# Patient Record
Sex: MALE | Race: WHITE | NOT HISPANIC OR LATINO | Employment: OTHER | URBAN - METROPOLITAN AREA
[De-identification: names, ages, dates, MRNs, and addresses within clinical notes are randomized per-mention and may not be internally consistent; named-entity substitution may affect disease eponyms.]

---

## 2018-11-13 ENCOUNTER — HOSPITAL ENCOUNTER (OUTPATIENT)
Facility: HOSPITAL | Age: 83
Discharge: HOME | End: 2018-11-15
Attending: EMERGENCY MEDICINE | Admitting: INTERNAL MEDICINE
Payer: MEDICARE

## 2018-11-13 DIAGNOSIS — I67.4 HYPERTENSIVE ENCEPHALOPATHY: Primary | ICD-10-CM

## 2018-11-13 DIAGNOSIS — G93.40 ENCEPHALOPATHY ACUTE: ICD-10-CM

## 2018-11-13 LAB
ALBUMIN SERPL BCP-MCNC: 3.4 G/DL
ALP SERPL-CCNC: 90 U/L
ALT SERPL W/O P-5'-P-CCNC: 23 U/L
AMMONIA PLAS-SCNC: 38 UMOL/L
ANION GAP SERPL CALC-SCNC: 7 MMOL/L
AST SERPL-CCNC: 39 U/L
BASOPHILS # BLD AUTO: 0.04 K/UL
BASOPHILS NFR BLD: 0.7 %
BILIRUB SERPL-MCNC: 0.5 MG/DL
BUN SERPL-MCNC: 18 MG/DL
CALCIUM SERPL-MCNC: 9 MG/DL
CHLORIDE SERPL-SCNC: 106 MMOL/L
CHOLEST SERPL-MCNC: 163 MG/DL
CHOLEST/HDLC SERPL: 3.8 {RATIO}
CO2 SERPL-SCNC: 25 MMOL/L
CREAT SERPL-MCNC: 1.1 MG/DL
DIFFERENTIAL METHOD: ABNORMAL
EOSINOPHIL # BLD AUTO: 0.2 K/UL
EOSINOPHIL NFR BLD: 4.1 %
ERYTHROCYTE [DISTWIDTH] IN BLOOD BY AUTOMATED COUNT: 13.6 %
EST. GFR  (AFRICAN AMERICAN): >60 ML/MIN/1.73 M^2
EST. GFR  (NON AFRICAN AMERICAN): >60 ML/MIN/1.73 M^2
GLUCOSE SERPL-MCNC: 110 MG/DL
HCT VFR BLD AUTO: 34.5 %
HDLC SERPL-MCNC: 43 MG/DL
HDLC SERPL: 26.4 %
HGB BLD-MCNC: 11.6 G/DL
IMM GRANULOCYTES # BLD AUTO: 0.03 K/UL
IMM GRANULOCYTES NFR BLD AUTO: 0.5 %
INR PPP: 1
LDLC SERPL CALC-MCNC: 106.2 MG/DL
LYMPHOCYTES # BLD AUTO: 0.7 K/UL
LYMPHOCYTES NFR BLD: 11.8 %
MAGNESIUM SERPL-MCNC: 1.8 MG/DL
MCH RBC QN AUTO: 31.6 PG
MCHC RBC AUTO-ENTMCNC: 33.6 G/DL
MCV RBC AUTO: 94 FL
MONOCYTES # BLD AUTO: 0.6 K/UL
MONOCYTES NFR BLD: 10.3 %
NEUTROPHILS # BLD AUTO: 4.3 K/UL
NEUTROPHILS NFR BLD: 72.6 %
NONHDLC SERPL-MCNC: 120 MG/DL
NRBC BLD-RTO: 0 /100 WBC
PLATELET # BLD AUTO: 185 K/UL
PMV BLD AUTO: 9.4 FL
POTASSIUM SERPL-SCNC: 3.8 MMOL/L
PROT SERPL-MCNC: 7.6 G/DL
PROTHROMBIN TIME: 10.5 SEC
RBC # BLD AUTO: 3.67 M/UL
SODIUM SERPL-SCNC: 138 MMOL/L
TRIGL SERPL-MCNC: 69 MG/DL
WBC # BLD AUTO: 5.91 K/UL

## 2018-11-13 PROCEDURE — 83735 ASSAY OF MAGNESIUM: CPT

## 2018-11-13 PROCEDURE — 82140 ASSAY OF AMMONIA: CPT

## 2018-11-13 PROCEDURE — 85610 PROTHROMBIN TIME: CPT

## 2018-11-13 PROCEDURE — 84443 ASSAY THYROID STIM HORMONE: CPT

## 2018-11-13 PROCEDURE — 99285 EMERGENCY DEPT VISIT HI MDM: CPT

## 2018-11-13 PROCEDURE — 99291 CRITICAL CARE FIRST HOUR: CPT | Mod: GC,,, | Performed by: EMERGENCY MEDICINE

## 2018-11-13 PROCEDURE — 80053 COMPREHEN METABOLIC PANEL: CPT

## 2018-11-13 PROCEDURE — 80061 LIPID PANEL: CPT

## 2018-11-13 PROCEDURE — 85025 COMPLETE CBC W/AUTO DIFF WBC: CPT

## 2018-11-14 PROBLEM — G93.40 ENCEPHALOPATHY ACUTE: Status: ACTIVE | Noted: 2018-11-14

## 2018-11-14 PROBLEM — I50.9 CHF (CONGESTIVE HEART FAILURE): Status: ACTIVE | Noted: 2018-11-14

## 2018-11-14 PROBLEM — I25.10 CORONARY ARTERY DISEASE INVOLVING NATIVE CORONARY ARTERY OF NATIVE HEART: Status: ACTIVE | Noted: 2018-11-14

## 2018-11-14 PROBLEM — I10 HYPERTENSION: Status: ACTIVE | Noted: 2018-11-14

## 2018-11-14 LAB
AMPHET+METHAMPHET UR QL: NEGATIVE
ANION GAP SERPL CALC-SCNC: 7 MMOL/L
ASCENDING AORTA: 3.62 CM
AV MEAN GRADIENT: 18 MMHG
AV PEAK GRADIENT: 30.69 MMHG
AV VALVE AREA: 1.13 CM2
BARBITURATES UR QL SCN>200 NG/ML: NEGATIVE
BASOPHILS # BLD AUTO: 0.03 K/UL
BASOPHILS NFR BLD: 0.7 %
BENZODIAZ UR QL SCN>200 NG/ML: NEGATIVE
BILIRUB UR QL STRIP: NEGATIVE
BSA FOR ECHO PROCEDURE: 1.89 M2
BUN SERPL-MCNC: 19 MG/DL
BZE UR QL SCN: NEGATIVE
CALCIUM SERPL-MCNC: 8.4 MG/DL
CANNABINOIDS UR QL SCN: NEGATIVE
CHLORIDE SERPL-SCNC: 109 MMOL/L
CLARITY UR REFRACT.AUTO: CLEAR
CO2 SERPL-SCNC: 23 MMOL/L
COLOR UR AUTO: YELLOW
CREAT SERPL-MCNC: 1.1 MG/DL
CREAT UR-MCNC: 24 MG/DL
CV ECHO LV RWT: 0.37 CM
DIFFERENTIAL METHOD: ABNORMAL
DOP CALC AO PEAK VEL: 2.77 M/S
DOP CALC AO VTI: 56.23 CM
DOP CALC LVOT AREA: 3.2 CM2
DOP CALC LVOT DIAMETER: 2.02 CM
DOP CALC LVOT STROKE VOLUME: 63.71 CM3
DOP CALCLVOT PEAK VEL VTI: 19.89 CM
E WAVE DECELERATION TIME: 281.96 MSEC
E/A RATIO: 0.85
E/E' RATIO: 20.27
ECHO LV POSTERIOR WALL: 0.91 CM (ref 0.6–1.1)
EOSINOPHIL # BLD AUTO: 0.3 K/UL
EOSINOPHIL NFR BLD: 6.1 %
ERYTHROCYTE [DISTWIDTH] IN BLOOD BY AUTOMATED COUNT: 13.7 %
EST. GFR  (AFRICAN AMERICAN): >60 ML/MIN/1.73 M^2
EST. GFR  (NON AFRICAN AMERICAN): >60 ML/MIN/1.73 M^2
ESTIMATED AVG GLUCOSE: 103 MG/DL
FRACTIONAL SHORTENING: 28 % (ref 28–44)
GLUCOSE SERPL-MCNC: 109 MG/DL
GLUCOSE UR QL STRIP: NEGATIVE
HBA1C MFR BLD HPLC: 5.2 %
HCT VFR BLD AUTO: 31.5 %
HGB BLD-MCNC: 10.5 G/DL
HGB UR QL STRIP: NEGATIVE
IMM GRANULOCYTES # BLD AUTO: 0.02 K/UL
IMM GRANULOCYTES NFR BLD AUTO: 0.4 %
INTERVENTRICULAR SEPTUM: 0.89 CM (ref 0.6–1.1)
KETONES UR QL STRIP: NEGATIVE
LA MAJOR: 4.86 CM
LA MINOR: 5.55 CM
LA WIDTH: 4.29 CM
LEFT ATRIUM SIZE: 3.91 CM
LEFT ATRIUM VOLUME INDEX: 39.1 ML/M2
LEFT ATRIUM VOLUME: 73.89 CM3
LEFT INTERNAL DIMENSION IN SYSTOLE: 3.5 CM (ref 2.1–4)
LEFT VENTRICLE DIASTOLIC VOLUME INDEX: 58.77 ML/M2
LEFT VENTRICLE DIASTOLIC VOLUME: 111.08 ML
LEFT VENTRICLE MASS INDEX: 80.1 G/M2
LEFT VENTRICLE SYSTOLIC VOLUME INDEX: 26.9 ML/M2
LEFT VENTRICLE SYSTOLIC VOLUME: 50.82 ML
LEFT VENTRICULAR INTERNAL DIMENSION IN DIASTOLE: 4.87 CM (ref 3.5–6)
LEFT VENTRICULAR MASS: 151.39 G
LEUKOCYTE ESTERASE UR QL STRIP: NEGATIVE
LV LATERAL E/E' RATIO: 25.33
LV SEPTAL E/E' RATIO: 16.89
LYMPHOCYTES # BLD AUTO: 0.7 K/UL
LYMPHOCYTES NFR BLD: 15.3 %
MAGNESIUM SERPL-MCNC: 1.9 MG/DL
MCH RBC QN AUTO: 31.5 PG
MCHC RBC AUTO-ENTMCNC: 33.3 G/DL
MCV RBC AUTO: 95 FL
METHADONE UR QL SCN>300 NG/ML: NEGATIVE
MONOCYTES # BLD AUTO: 0.6 K/UL
MONOCYTES NFR BLD: 13.7 %
MV MEAN GRADIENT: 6 MMHG
MV PEAK A VEL: 1.79 M/S
MV PEAK E VEL: 1.52 M/S
MV STENOSIS PRESSURE HALF TIME: 136 MS
MV VALVE AREA P 1/2 METHOD: 1.62 CM2
NEUTROPHILS # BLD AUTO: 2.8 K/UL
NEUTROPHILS NFR BLD: 63.8 %
NITRITE UR QL STRIP: NEGATIVE
NRBC BLD-RTO: 0 /100 WBC
OPIATES UR QL SCN: NEGATIVE
PCP UR QL SCN>25 NG/ML: NEGATIVE
PH UR STRIP: 8 [PH] (ref 5–8)
PHOSPHATE SERPL-MCNC: 3.2 MG/DL
PISA TR MAX VEL: 2.57 M/S
PLATELET # BLD AUTO: 176 K/UL
PMV BLD AUTO: 9.9 FL
POTASSIUM SERPL-SCNC: 3.5 MMOL/L
PROT UR QL STRIP: NEGATIVE
PULM VEIN S/D RATIO: 0.63
PV PEAK D VEL: 0.64 M/S
PV PEAK S VEL: 0.4 M/S
RA MAJOR: 4.71 CM
RA PRESSURE: 3 MMHG
RA WIDTH: 2.51 CM
RBC # BLD AUTO: 3.33 M/UL
RIGHT VENTRICULAR END-DIASTOLIC DIMENSION: 3.62 CM
RV TISSUE DOPPLER FREE WALL SYSTOLIC VELOCITY 1 (APICAL 4 CHAMBER VIEW): 9.08 M/S
SINUS: 3.62 CM
SODIUM SERPL-SCNC: 139 MMOL/L
SP GR UR STRIP: 1.01 (ref 1–1.03)
STJ: 2.73 CM
TDI LATERAL: 0.06
TDI SEPTAL: 0.09
TDI: 0.08
TOXICOLOGY INFORMATION: NORMAL
TR MAX PG: 26.42 MMHG
TRICUSPID ANNULAR PLANE SYSTOLIC EXCURSION: 1.26 CM
TSH SERPL DL<=0.005 MIU/L-ACNC: 2.38 UIU/ML
TV REST PULMONARY ARTERY PRESSURE: 29.42 MMHG
URN SPEC COLLECT METH UR: NORMAL
WBC # BLD AUTO: 4.45 K/UL

## 2018-11-14 PROCEDURE — 94660 CPAP INITIATION&MGMT: CPT

## 2018-11-14 PROCEDURE — 84100 ASSAY OF PHOSPHORUS: CPT

## 2018-11-14 PROCEDURE — 80307 DRUG TEST PRSMV CHEM ANLYZR: CPT

## 2018-11-14 PROCEDURE — 27000190 HC CPAP FULL FACE MASK W/VALVE

## 2018-11-14 PROCEDURE — 99220 PR INITIAL OBSERVATION CARE,LEVL III: CPT | Mod: ,,, | Performed by: PHYSICIAN ASSISTANT

## 2018-11-14 PROCEDURE — 81003 URINALYSIS AUTO W/O SCOPE: CPT | Mod: 59

## 2018-11-14 PROCEDURE — 99900035 HC TECH TIME PER 15 MIN (STAT)

## 2018-11-14 PROCEDURE — 83036 HEMOGLOBIN GLYCOSYLATED A1C: CPT

## 2018-11-14 PROCEDURE — G0378 HOSPITAL OBSERVATION PER HR: HCPCS

## 2018-11-14 PROCEDURE — 80048 BASIC METABOLIC PNL TOTAL CA: CPT

## 2018-11-14 PROCEDURE — 83735 ASSAY OF MAGNESIUM: CPT

## 2018-11-14 PROCEDURE — 85025 COMPLETE CBC W/AUTO DIFF WBC: CPT

## 2018-11-14 PROCEDURE — 36415 COLL VENOUS BLD VENIPUNCTURE: CPT

## 2018-11-14 PROCEDURE — 25000003 PHARM REV CODE 250: Performed by: PHYSICIAN ASSISTANT

## 2018-11-14 RX ORDER — CLOPIDOGREL BISULFATE 75 MG/1
75 TABLET ORAL DAILY
COMMUNITY

## 2018-11-14 RX ORDER — CLOPIDOGREL BISULFATE 75 MG/1
75 TABLET ORAL DAILY
Status: DISCONTINUED | OUTPATIENT
Start: 2018-11-14 | End: 2018-11-15 | Stop reason: HOSPADM

## 2018-11-14 RX ORDER — AMLODIPINE BESYLATE 5 MG/1
5 TABLET ORAL DAILY
Status: DISCONTINUED | OUTPATIENT
Start: 2018-11-14 | End: 2018-11-14

## 2018-11-14 RX ORDER — ONDANSETRON 8 MG/1
8 TABLET, ORALLY DISINTEGRATING ORAL EVERY 8 HOURS PRN
Status: DISCONTINUED | OUTPATIENT
Start: 2018-11-14 | End: 2018-11-15 | Stop reason: HOSPADM

## 2018-11-14 RX ORDER — HYDRALAZINE HYDROCHLORIDE 10 MG/1
10 TABLET, FILM COATED ORAL EVERY 8 HOURS PRN
Status: DISCONTINUED | OUTPATIENT
Start: 2018-11-14 | End: 2018-11-15 | Stop reason: HOSPADM

## 2018-11-14 RX ORDER — ACETAMINOPHEN 500 MG
1000 TABLET ORAL EVERY 8 HOURS PRN
Status: DISCONTINUED | OUTPATIENT
Start: 2018-11-14 | End: 2018-11-15 | Stop reason: HOSPADM

## 2018-11-14 RX ORDER — IPRATROPIUM BROMIDE AND ALBUTEROL SULFATE 2.5; .5 MG/3ML; MG/3ML
3 SOLUTION RESPIRATORY (INHALATION) EVERY 4 HOURS PRN
Status: DISCONTINUED | OUTPATIENT
Start: 2018-11-14 | End: 2018-11-15 | Stop reason: HOSPADM

## 2018-11-14 RX ORDER — NAPROXEN SODIUM 220 MG/1
81 TABLET, FILM COATED ORAL DAILY
COMMUNITY

## 2018-11-14 RX ORDER — IBUPROFEN 200 MG
24 TABLET ORAL
Status: DISCONTINUED | OUTPATIENT
Start: 2018-11-14 | End: 2018-11-15 | Stop reason: HOSPADM

## 2018-11-14 RX ORDER — LOSARTAN POTASSIUM 50 MG/1
100 TABLET ORAL DAILY
Status: DISCONTINUED | OUTPATIENT
Start: 2018-11-14 | End: 2018-11-14

## 2018-11-14 RX ORDER — SODIUM CHLORIDE 0.9 % (FLUSH) 0.9 %
5 SYRINGE (ML) INJECTION
Status: DISCONTINUED | OUTPATIENT
Start: 2018-11-14 | End: 2018-11-15 | Stop reason: HOSPADM

## 2018-11-14 RX ORDER — NAPROXEN SODIUM 220 MG/1
81 TABLET, FILM COATED ORAL DAILY
Status: DISCONTINUED | OUTPATIENT
Start: 2018-11-14 | End: 2018-11-15 | Stop reason: HOSPADM

## 2018-11-14 RX ORDER — ACETAMINOPHEN 325 MG/1
650 TABLET ORAL EVERY 8 HOURS PRN
Status: DISCONTINUED | OUTPATIENT
Start: 2018-11-14 | End: 2018-11-15 | Stop reason: HOSPADM

## 2018-11-14 RX ORDER — BISACODYL 10 MG
10 SUPPOSITORY, RECTAL RECTAL DAILY PRN
Status: DISCONTINUED | OUTPATIENT
Start: 2018-11-14 | End: 2018-11-15 | Stop reason: HOSPADM

## 2018-11-14 RX ORDER — IBUPROFEN 200 MG
16 TABLET ORAL
Status: DISCONTINUED | OUTPATIENT
Start: 2018-11-14 | End: 2018-11-15 | Stop reason: HOSPADM

## 2018-11-14 RX ORDER — LOSARTAN POTASSIUM 100 MG/1
100 TABLET ORAL DAILY
Status: ON HOLD | COMMUNITY
End: 2018-11-15 | Stop reason: HOSPADM

## 2018-11-14 RX ORDER — CARVEDILOL 3.12 MG/1
3.12 TABLET ORAL 2 TIMES DAILY
Status: DISCONTINUED | OUTPATIENT
Start: 2018-11-14 | End: 2018-11-14

## 2018-11-14 RX ORDER — AMLODIPINE BESYLATE 10 MG/1
10 TABLET ORAL DAILY
Status: DISCONTINUED | OUTPATIENT
Start: 2018-11-15 | End: 2018-11-15 | Stop reason: HOSPADM

## 2018-11-14 RX ORDER — ASPIRIN 325 MG
325 TABLET, DELAYED RELEASE (ENTERIC COATED) ORAL WEEKLY
COMMUNITY

## 2018-11-14 RX ORDER — POLYETHYLENE GLYCOL 3350 17 G/17G
17 POWDER, FOR SOLUTION ORAL DAILY
Status: DISCONTINUED | OUTPATIENT
Start: 2018-11-14 | End: 2018-11-15 | Stop reason: HOSPADM

## 2018-11-14 RX ORDER — ACETAMINOPHEN 325 MG/1
650 TABLET ORAL EVERY 4 HOURS PRN
Status: DISCONTINUED | OUTPATIENT
Start: 2018-11-14 | End: 2018-11-15 | Stop reason: HOSPADM

## 2018-11-14 RX ORDER — HYDROCHLOROTHIAZIDE 12.5 MG/1
12.5 TABLET ORAL DAILY
Status: DISCONTINUED | OUTPATIENT
Start: 2018-11-14 | End: 2018-11-15 | Stop reason: HOSPADM

## 2018-11-14 RX ORDER — GLUCAGON 1 MG
1 KIT INJECTION
Status: DISCONTINUED | OUTPATIENT
Start: 2018-11-14 | End: 2018-11-15 | Stop reason: HOSPADM

## 2018-11-14 RX ORDER — CARVEDILOL 3.12 MG/1
3.12 TABLET ORAL DAILY
Status: DISCONTINUED | OUTPATIENT
Start: 2018-11-14 | End: 2018-11-15

## 2018-11-14 RX ADMIN — POLYETHYLENE GLYCOL 3350 17 G: 17 POWDER, FOR SOLUTION ORAL at 08:11

## 2018-11-14 RX ADMIN — CLOPIDOGREL 75 MG: 75 TABLET, FILM COATED ORAL at 08:11

## 2018-11-14 RX ADMIN — CARVEDILOL 3.12 MG: 3.12 TABLET, FILM COATED ORAL at 04:11

## 2018-11-14 RX ADMIN — ASPIRIN 81 MG CHEWABLE TABLET 81 MG: 81 TABLET CHEWABLE at 08:11

## 2018-11-14 RX ADMIN — HYDROCHLOROTHIAZIDE 12.5 MG: 12.5 TABLET ORAL at 01:11

## 2018-11-14 RX ADMIN — AMLODIPINE BESYLATE 5 MG: 5 TABLET ORAL at 08:11

## 2018-11-14 NOTE — ASSESSMENT & PLAN NOTE
- stop losartan 2/2 reported hyperkalemia at home  - start norvasc 5 mg, hydralazine 10 mg PRN   - suspect patient was previously on hydralazine 50 mg TID w/o checking BP prior to give causing him to feel weak and was subsequently stopped  - avoid over correction of BP  - symptoms resolving with improvement in BP  - suspected SAH on CTH at OSH not confirmd with CTA, NCC consult appreciated  - neuro checks  - echo in AM

## 2018-11-14 NOTE — PLAN OF CARE
11/14/18 1245   Discharge Assessment   Assessment Type Discharge Planning Assessment   Confirmed/corrected address and phone number on facesheet? Yes   Assessment information obtained from? Patient;Caregiver  (spouse, Elizabeth Prescott ([phone]), & adult daughter, Annette Wong ([phone]))   Expected Length of Stay (days) 2   Communicated expected length of stay with patient/caregiver yes   Prior to hospitilization cognitive status: Alert/Oriented   Prior to hospitalization functional status: Independent   Current cognitive status: Alert/Oriented   Current Functional Status: Independent   Lives With spouse  (Elizabeth Prescott)   Able to Return to Prior Arrangements yes   Is patient able to care for self after discharge? Yes   Patient's perception of discharge disposition home or selfcare   Readmission Within The Last 30 Days no previous admission in last 30 days   Patient currently being followed by outpatient case management? No   Patient currently receives any other outside agency services? No   Equipment Currently Used at Home CPAP;other (see comments)  (BP machine)   Do you have any problems affording any of your prescribed medications? No   Is the patient taking medications as prescribed? yes   Does the patient have transportation home? Yes   Transportation Available family or friend will provide   Does the patient receive services at the [facility]? No   Discharge Plan A Home with family   Discharge Plan B Home Health   Patient/Family In Agreement With Plan yes     Dx: hypertensive encephalopathy  Pharm: [facility]  Hosp f/u appt: Dr. Pelon Gama (PCP) on 11/27/18 at 0915 & Dr. Greg Angulo (cards) on 12/4/18 at 0826

## 2018-11-14 NOTE — HOSPITAL COURSE
85M admitted to OBS for BP control in setting of HTN emergency SBP >200. Transferred from [facility] for concern for SAH on CTH. Neuro Critical Care at [facility] reviewed CTH and CTA, no compelling evidence of SAH. No aneurysm or other malformation on CTA. Likely hypertensive encephalopathy, which is began resolving since admission. Home Losartan 100mg discontinued as patient's family reports it caused him to be hyperkalemic. K 3.5>4.5 during stay. Started Norvasc 10mg, Coreg 6.25mg and HCTZ 12.5mg BP improving, 145/72 on discharge. TTE shows indeterminate diastolic dysfunction with pEF, severe mitral valve stenosis. Patient alert and oriented and back at baseline per family, stable for discharge with [facility] nurse to check BP Mon 11/19 as well as check BMP for K+ levels. PCP f/u 11/27 and Cardiology f/u 12/4.

## 2018-11-14 NOTE — ASSESSMENT & PLAN NOTE
Essential Hypertension  - stop losartan 2/2 reported hyperkalemia at home  - start norvasc 5 mg, hydralazine 10 mg PRN   - suspect patient was previously on hydralazine 50 mg TID w/o checking BP prior to give causing him to feel weak and was subsequently stopped  - avoid over correction of BP  - symptoms resolving with improvement in BP  - suspected SAH on CTH at OSH not confirmd with CTA, NCC consult appreciated  - neuro checks  - echo in AM  11/14: Renal US negative for renal artery stenosis  - increased Norvasc to 10mg; started Coreg 3.125mg and HCTZ 12.5mg daily  BP labile throughout day  -Echo shows indeterminate diastolic dysfunction with pEF, severe mitral valve stenosis.

## 2018-11-14 NOTE — H&P
[facility] Hospital Medicine  History & Physical    Patient Name: Rafael Prescott  MRN: [id number]  Admission Date: 11/13/2018  Attending Physician: ROBIN Koch MD   Primary Care Provider: [facility] Medicine Team: [facility] HOSP MED  Chente Fuentes PA-C     Patient information was obtained from patient, spouse/SO, relative(s), past medical records and ER records.     Subjective:     Principal Problem:Hypertensive encephalopathy    Chief Complaint:   Chief Complaint   [facility]   [facility]     Pt [facility] for neuro services for SAH and encephalopathy. Today at 1 pm, pt began with weakness and expressive aphasia. He was hypertensive at >200 SBP upon arrival to [facility]; cardene was started. Denies falls at home. Pt is currently taking plavix and asa at home. He is A,A,O x 2; disoriented to time.          HPI: Rafael Prescott is an 85 y.o. male with HTN, HLD, CAD s/p CABG x 4 on DAPT who presents as a transfer from [facility] for confusion and weakness. Family reports onset of symptoms at 1 pm when he acutely developed confusion, shuffling gait, slumping to the right, and expressive aphasia. He presented to [facility] around 230 and his SBP >200 and started on cardene drip, CTH showed questionable R SAH. CTA negative for aneurysm. Patient was transferred here for evaluation of possible SAH. On arrival to [facility] ED, BP was better controlled cardene drip was stopped and his symptoms have resolved to 95% to baseline.     Family states he did take BP medications this mornin and notes that his PCP has been dealing with BP for the last few months. He has been on losartan for a few months, but recently started having high potassium levels for which he was taking kayexalate, but still was taking lorastan. He was also started on a medication that made him weak and was stopped - medication started with an H, dosed TID at 50 mg. He denies CP, no SOB, no new medication,  no fever, no cough, no  vision changes, no HA. SBP at home 180s/90 last few days. Episode has never happened before.    ED: evaluated by NCC, no bleed and no need for admission with resolving BP and symptoms, UDS, UA negative.    Past Medical History:   Diagnosis Date    Arthritis     CHF (congestive heart failure)     Hypertension        Past Surgical History:   Procedure Laterality Date    APPENDECTOMY      CARDIAC SURGERY      EYE SURGERY      HERNIA REPAIR         Review of patient's allergies indicates:  No Known Allergies    No current facility-administered medications on file prior to encounter.      Current Outpatient Medications on File Prior to Encounter   Medication Sig    aspirin (ECOTRIN) 325 MG EC tablet Take 325 mg by mouth once a week.    aspirin 81 MG Chew Take 81 mg by mouth once daily.    clopidogrel (PLAVIX) 75 mg tablet Take 75 mg by mouth once daily.    losartan (COZAAR) 100 MG tablet Take 100 mg by mouth once daily.     Family History     None        Tobacco Use    Smoking status: Never Smoker   Substance and Sexual Activity    Alcohol use: No     Frequency: Never    Drug use: No    Sexual activity: Yes     Partners: Female     Review of Systems   Constitutional: Negative for chills, fatigue and fever.   Eyes: Negative for photophobia and visual disturbance.   Respiratory: Negative for cough, shortness of breath and wheezing.    Cardiovascular: Negative for chest pain, palpitations and leg swelling.   Gastrointestinal: Negative for abdominal distention, abdominal pain, nausea and vomiting.   Genitourinary: Negative for difficulty urinating and dysuria.   Musculoskeletal: Negative for arthralgias, back pain and gait problem.   Skin: Negative for rash and wound.   Neurological: Negative for dizziness, tremors, facial asymmetry, weakness, light-headedness and headaches.   Psychiatric/Behavioral: Positive for confusion. Negative for agitation.     Objective:     Vital Signs (Most Recent):  Temp: 98.5 °F  (36.9 °C) (11/13/18 2249)  Pulse: 85 (11/14/18 0202)  Resp: 16 (11/13/18 2249)  BP: (!) 167/91 (11/14/18 0202)  SpO2: 97 % (11/14/18 0202) Vital Signs (24h Range):  Temp:  [98.5 °F (36.9 °C)] 98.5 °F (36.9 °C)  Pulse:  [80-90] 85  Resp:  [16] 16  SpO2:  [93 %-99 %] 97 %  BP: (146-172)/(81-92) 167/91     Weight: 81.6 kg (180 lb)  Body mass index is 29.95 kg/m².    Physical Exam   Constitutional: He is oriented to person, place, and time. He appears well-developed and well-nourished. No distress.   HENT:   Head: Normocephalic and atraumatic.   Eyes: EOM are normal. Pupils are equal, round, and reactive to light.   Neck: Normal range of motion. Neck supple.   Cardiovascular: Normal rate and regular rhythm.   Murmur heard.  Pulmonary/Chest: Effort normal and breath sounds normal. No respiratory distress.   Abdominal: Soft. Bowel sounds are normal. He exhibits no distension. There is no tenderness.   Musculoskeletal: Normal range of motion. He exhibits no edema.   Neurological: He is alert and oriented to person, place, and time. No cranial nerve deficit.   5+ strength UE/LE, no facial droop, no slurred speech, AO x 4   Skin: Skin is warm and dry. He is not diaphoretic.   Psychiatric: He has a normal mood and affect. His behavior is normal. Judgment and thought content normal.   Nursing note and vitals reviewed.        CRANIAL NERVES     CN III, IV, VI   Pupils are equal, round, and reactive to light.  Extraocular motions are normal.        Significant Labs:   CBC:   Recent Labs   Lab 11/13/18 2307   WBC 5.91   HGB 11.6*   HCT 34.5*        CMP:   Recent Labs   Lab 11/13/18 2307      K 3.8      CO2 25      BUN 18   CREATININE 1.1   CALCIUM 9.0   PROT 7.6   ALBUMIN 3.4*   BILITOT 0.5   ALKPHOS 90   AST 39   ALT 23   ANIONGAP 7*   EGFRNONAA >60.0     Cardiac Markers: No results for input(s): CKMB, MYOGLOBIN, BNP, TROPISTAT in the last 48 hours.  Troponin: No results for input(s): TROPONINI in the  last 48 hours.  TSH:   Recent Labs   Lab 11/13/18  2307   TSH 2.379     Urine Culture: No results for input(s): LABURIN in the last 48 hours.    Significant Imaging: I have reviewed all pertinent imaging results/findings within the past 24 hours.    Assessment/Plan:     * Hypertensive encephalopathy    - stop losartan 2/2 reported hyperkalemia at home  - start norvasc 5 mg, hydralazine 10 mg PRN   - suspect patient was previously on hydralazine 50 mg TID w/o checking BP prior to give causing him to feel weak and was subsequently stopped  - avoid over correction of BP  - symptoms resolving with improvement in BP  - suspected SAH on CTH at OSH not confirmd with CTA, NCC consult appreciated  - neuro checks  - echo in AM     Coronary artery disease involving native coronary artery of native heart    - continue plavix, ASA     CHF (congestive heart failure)    - historical diagnosis, no echo on file  - family unaware of diagnosis  - follow echo in AM       VTE Risk Mitigation (From admission, onward)        Ordered     Place MARY hose  Until discontinued      11/14/18 0114     Place sequential compression device  Until discontinued      11/14/18 0114     IP VTE HIGH RISK PATIENT  Once      11/14/18 0114             Chente Fuentes PA-C  Department of Hospital Medicine   [facility]

## 2018-11-14 NOTE — NURSING
Pt in bed awake and alert oriented x4. Did go to have TTE completed off unit earlier in shift as well as abd US complete. Discussed pts post diagnostic amnesia and confusion regarding evens off unit with MD. Pt did reply and move all appropriately upon return, but family states irregularities and was consistent upon assessment, stated he spoke to me downstairs prior to procedure, did clarify with pt and this is the only irregularity as of now. MD did come by for assessment. Order was given to hold off on coreg for this morning after am pressure was in 150s, HZTZ was given as ordered. BP into 170s as of now with PRN for hydralazine if BP >180 systolic. Pt ate meal and voided without any difficulties. No distress noted with PIV c/d/i, sats maintaining >95% on RA. Will continue to monitor.

## 2018-11-14 NOTE — PLAN OF CARE
Problem: Patient Care Overview  Goal: Plan of Care Review  Outcome: Ongoing (interventions implemented as appropriate)  Pt AAOx3, still have delayed response in speech. CPAP is in use, pt asleep. Free of fall, bed rail x2. No other distress reported by pt.

## 2018-11-14 NOTE — CONSULTS
[facility]  Neurocritical Care  Consult Note    Admit Date: 11/13/2018  Service Date: 11/14/2018  Length of Stay: 0    Consults  Subjective:     Chief Complaint: Hypertensive encephalopathy    History of Present Illness: Mr. Prescott is a 84 y/o male with PMH significant for CAD s/p multiple CABG, HTN, CHF who presents to [facility] ED as a transfer for AMS with concern for SAH. Patient developed acute confusion at approximately 1300 on 11/13, which prompted his wife to bring him to him to the ED. SBP on initial evaluation in OSH was 200+ and he was started on nicardipine gtt. CTH at OSH showed questionable R SAH. CTA negative for aneurysm. Patient was transferred here for evaluation of possible SAH.     Patient and wife deny any recent trauma, state that he fell backward while working in his shed approximately one month ago, but has had no symptoms (HA, weakness, confusion) between then and today.    On evaluation here, patient returning to baseline mental status per wife, although still a bit slower than usual. He denies any current HA, weakness, worsening vision, or dizziness. He is no longer on a nicardipine infusion.       Past Medical History:   Diagnosis Date    Arthritis     CHF (congestive heart failure)     Hypertension      Past Surgical History:   Procedure Laterality Date    APPENDECTOMY      CARDIAC SURGERY      EYE SURGERY      HERNIA REPAIR        No current facility-administered medications on file prior to encounter.      Current Outpatient Medications on File Prior to Encounter   Medication Sig Dispense Refill    aspirin (ECOTRIN) 325 MG EC tablet Take 325 mg by mouth once a week.      aspirin 81 MG Chew Take 81 mg by mouth once daily.      clopidogrel (PLAVIX) 75 mg tablet Take 75 mg by mouth once daily.      losartan (COZAAR) 100 MG tablet Take 100 mg by mouth once daily.        Allergies: Patient has no known allergies.    History reviewed. No pertinent family  history.  Social History     Tobacco Use    Smoking status: Never Smoker   Substance Use Topics    Alcohol use: No     Frequency: Never    Drug use: No     Review of Systems   Constitutional: Negative for chills and fever.   Eyes: Negative for pain and visual disturbance.   Respiratory: Negative for chest tightness, shortness of breath and wheezing.    Cardiovascular: Negative for chest pain and palpitations.   Gastrointestinal: Positive for abdominal distention. Negative for abdominal pain, nausea and vomiting.   Musculoskeletal: Negative for back pain and neck pain.   Neurological: Negative for syncope, speech difficulty, weakness, light-headedness, numbness and headaches.     Objective:     Vitals:    Temp: 98.5 °F (36.9 °C)  Pulse: 82  BP: (!) 172/92  MAP (mmHg): 127  Resp: 16  SpO2: 99 %  O2 Device (Oxygen Therapy): room air    Temp  Min: 98.5 °F (36.9 °C)  Max: 98.5 °F (36.9 °C)  Pulse  Min: 80  Max: 90  BP  Min: 146/89  Max: 172/92  MAP (mmHg)  Min: 110  Max: 127  Resp  Min: 16  Max: 16  SpO2  Min: 93 %  Max: 99 %    No intake/output data recorded.           Physical Exam   Constitutional: He appears well-developed and well-nourished.   HENT:   Head: Normocephalic and atraumatic.   Nose: Nose normal.   Mouth/Throat: Oropharynx is clear and moist.   Eyes: EOM are normal. Pupils are equal, round, and reactive to light.   Cardiovascular: Normal rate and regular rhythm.   Pulmonary/Chest: Effort normal and breath sounds normal.   Abdominal: Soft. Bowel sounds are normal.   Surgical scars RLQ and femoral region nontender.     Small bruise to L abdominal wall   Musculoskeletal: Normal range of motion.   Neurological:   Alert, oriented to person, place, situation, month and year. Knows Coleridge's Day was on Sunday, could not name specific date.   No cranial nerve deficits, EOMI, PERRL, face symmetric, tongue midline, shoulder shrug equal.  No drift present to any of his four extremities. Full strength against  resistance.   No ataxia present  Sensation intact to light touch in all four extremities.        Today I personally reviewed pertinent medications, imaging, laboratory results, notably:    CTH, CTA head and neck. No compelling SAH seen on CTH, CTA negative for any vascular malformation.     Assessment/Plan:     Neuro   * Hypertensive encephalopathy    86 y/o presents with acute hypertension and AMS yesterday afternoon. Transferred from [facility] with NCC consulted for concern for SAH on CTH.   - CTH and CTA reviewed with Dr. Bush, no compelling evidence of SAH. No aneurysm or other malformation on CTA.  - Likely hypertensive encephalopathy, which is now resolving. Off nicardipine.   - No need for neurointensive care at this time. Recommend BP management and continued monitoring of neurological status.   - Please call neurocritical care with any questions or concerns.       Thank you for your consult. We will sign off.              Full Code    Larisa Herman PA-C  Neurocritical Care  [facility]

## 2018-11-14 NOTE — PROGRESS NOTES
Two patient identifiers verified. Consent obtained. No prior blood transfusion reaction noted. # 20 g IV in place to LFA. 3ml of Optison administered for 2D imaging, patient tolerated well. Imaging completed. IV flushed.

## 2018-11-14 NOTE — SUBJECTIVE & OBJECTIVE
Interval History: No acute events overnight. Pt sitting in bed eating lunch with family at bedside, they report improvement in his original symptoms of confusion. Most recent BP communicated by nurse 158/95. Discussed plan of care.     Review of Systems   Constitutional: Negative for chills, fatigue and fever.   Eyes: Negative for redness.   Respiratory: Negative for cough and shortness of breath.    Cardiovascular: Negative for chest pain.   Gastrointestinal: Negative for abdominal pain and nausea.   Genitourinary: Negative for difficulty urinating and dysuria.   Musculoskeletal: Negative for back pain and gait problem.   Skin: Negative for rash.   Neurological: Negative for tremors and facial asymmetry.   Psychiatric/Behavioral: Negative for agitation and confusion.     Objective:     Vital Signs (Most Recent):  Temp: 97.6 °F (36.4 °C) (11/14/18 1251)  Pulse: 73 (11/14/18 1251)  Resp: 18 (11/14/18 1251)  BP: (!) 176/95 (11/14/18 0700)  SpO2: 96 % (11/14/18 1251) Vital Signs (24h Range):  Temp:  [97.6 °F (36.4 °C)-98.5 °F (36.9 °C)] 97.6 °F (36.4 °C)  Pulse:  [70-90] 73  Resp:  [16-18] 18  SpO2:  [93 %-99 %] 96 %  BP: (146-176)/(81-95) 176/95     Weight: 77.6 kg (171 lb)  Body mass index is 28.46 kg/m².  No intake or output data in the 24 hours ending 11/14/18 1352   Physical Exam   Constitutional: He is oriented to person, place, and time. He appears well-developed and well-nourished. No distress.   HENT:   Head: Normocephalic and atraumatic.   Eyes: EOM are normal. Right eye exhibits no discharge. Left eye exhibits no discharge.   Neck: Normal range of motion. Neck supple.   Cardiovascular: Normal rate and regular rhythm.   Murmur heard.  Pulmonary/Chest: Effort normal and breath sounds normal. No stridor. No respiratory distress. He has no wheezes.   Abdominal: Soft. He exhibits no distension. There is no tenderness.   Musculoskeletal: Normal range of motion. He exhibits no edema.   Neurological: He is alert and  oriented to person, place, and time. No cranial nerve deficit.   5+ strength UE/LE, no facial droop, no slurred speech, AO to person, place, month, and president   Skin: Skin is warm and dry. He is not diaphoretic.   Psychiatric: He has a normal mood and affect. His behavior is normal.   Nursing note and vitals reviewed.      Significant Labs: All pertinent labs within the past 24 hours have been reviewed.    Significant Imaging: I have reviewed all pertinent imaging results/findings within the past 24 hours.

## 2018-11-14 NOTE — SUBJECTIVE & OBJECTIVE
Past Medical History:   Diagnosis Date    Arthritis     CHF (congestive heart failure)     Hypertension      Past Surgical History:   Procedure Laterality Date    APPENDECTOMY      CARDIAC SURGERY      EYE SURGERY      HERNIA REPAIR        No current facility-administered medications on file prior to encounter.      Current Outpatient Medications on File Prior to Encounter   Medication Sig Dispense Refill    aspirin (ECOTRIN) 325 MG EC tablet Take 325 mg by mouth once a week.      aspirin 81 MG Chew Take 81 mg by mouth once daily.      clopidogrel (PLAVIX) 75 mg tablet Take 75 mg by mouth once daily.      losartan (COZAAR) 100 MG tablet Take 100 mg by mouth once daily.        Allergies: Patient has no known allergies.    History reviewed. No pertinent family history.  Social History     Tobacco Use    Smoking status: Never Smoker   Substance Use Topics    Alcohol use: No     Frequency: Never    Drug use: No     Review of Systems   Constitutional: Negative for chills and fever.   Eyes: Negative for pain and visual disturbance.   Respiratory: Negative for chest tightness, shortness of breath and wheezing.    Cardiovascular: Negative for chest pain and palpitations.   Gastrointestinal: Positive for abdominal distention. Negative for abdominal pain, nausea and vomiting.   Musculoskeletal: Negative for back pain and neck pain.   Neurological: Negative for syncope, speech difficulty, weakness, light-headedness, numbness and headaches.     Objective:     Vitals:    Temp: 98.5 °F (36.9 °C)  Pulse: 82  BP: (!) 172/92  MAP (mmHg): 127  Resp: 16  SpO2: 99 %  O2 Device (Oxygen Therapy): room air    Temp  Min: 98.5 °F (36.9 °C)  Max: 98.5 °F (36.9 °C)  Pulse  Min: 80  Max: 90  BP  Min: 146/89  Max: 172/92  MAP (mmHg)  Min: 110  Max: 127  Resp  Min: 16  Max: 16  SpO2  Min: 93 %  Max: 99 %    No intake/output data recorded.           Physical Exam   Constitutional: He appears well-developed and well-nourished.    HENT:   Head: Normocephalic and atraumatic.   Nose: Nose normal.   Mouth/Throat: Oropharynx is clear and moist.   Eyes: EOM are normal. Pupils are equal, round, and reactive to light.   Cardiovascular: Normal rate and regular rhythm.   Pulmonary/Chest: Effort normal and breath sounds normal.   Abdominal: Soft. Bowel sounds are normal.   Surgical scars RLQ and femoral region nontender.     Small bruise to L abdominal wall   Musculoskeletal: Normal range of motion.   Neurological:   Alert, oriented to person, place, situation, month and year. Knows 's Day was on Sunday, could not name specific date.   No cranial nerve deficits, EOMI, PERRL, face symmetric, tongue midline, shoulder shrug equal.  No drift present to any of his four extremities. Full strength against resistance.   No ataxia present  Sensation intact to light touch in all four extremities.        Today I personally reviewed pertinent medications, imaging, laboratory results, notably:    CTH, CTA head and neck. No compelling SAH seen on CTH, CTA negative for any vascular malformation.

## 2018-11-14 NOTE — ED PROVIDER NOTES
Encounter Date: 11/13/2018    SCRIBE #1 NOTE: I, Jessee Villanueva, am scribing for, and in the presence of,  Dr. Brandon. I have scribed the following portions of the note -       History     Chief Complaint   Patient presents with    [facility] Transfer     Pt transferred for neuro services for SAH and encephalopathy. Today at 1 pm, pt began with weakness and expressive aphasia. He was hypertensive at >200 SBP upon arrival to [facility]; cardene was started. Denies falls at home. Pt is currently taking plavix and asa at home. He is A,A,O x 2; disoriented to time.       Rafael Prescott is a 85 y.o. male with past medical history of HTN, HLD, CAD s/p CABG on DAPT who presents as a transfer from [facility] for confusion and weakness. Symptoms started around 1pm with confusion, then on attempt to get out of the car to go to lunch he was unable to get into restaurant unassisted, was unable to order and conversation was hard to follow per wife. Patient was noted to be starting into space, leaning to his right side, and had a hard time unwrapping silverware. Patient presented to [facility] ED at 3pm where a stroke code was called; while in ED right sided weakness and expressive aphasia was noted. Patient was hypertensive with SBP > 200 and a cardene gtt was started. Patient was transferred to [facility] for neuro services.                    Review of patient's allergies indicates:  Allergies not on file  No past medical history on file.  No past surgical history on file.  No family history on file.  Social History     Tobacco Use    Smoking status: Not on file   Substance Use Topics    Alcohol use: Not on file    Drug use: Not on file     Review of Systems   Constitutional: Negative for chills and fever.   HENT: Negative for sinus pressure and sinus pain.    Eyes: Negative for photophobia and visual disturbance.   Respiratory: Negative for cough, chest tightness and shortness of breath.    Cardiovascular: Negative for chest pain and leg swelling.    Gastrointestinal: Negative for abdominal pain, nausea and vomiting.   Genitourinary: Negative for dysuria and urgency.   Musculoskeletal: Negative for arthralgias and myalgias.   Skin: Negative for color change and pallor.   Neurological: Negative for dizziness, light-headedness and headaches.   Psychiatric/Behavioral: Positive for confusion.       Physical Exam     Initial Vitals [11/13/18 2249]   BP Pulse Resp Temp SpO2   (!) 146/89 89 16 98.5 °F (36.9 °C) (!) 94 %      MAP       --         Physical Exam    Constitutional: He appears well-developed and well-nourished.   HENT:   Head: Normocephalic and atraumatic.   Eyes: Conjunctivae are normal. No scleral icterus.   Neck: Normal range of motion. Neck supple.   Cardiovascular: Normal rate and regular rhythm. Exam reveals no gallop and no friction rub.    Murmur (3/6 systolic) heard.  Pulmonary/Chest: Breath sounds normal. No respiratory distress. He has no wheezes. He has no rales.   Abdominal: Soft. Bowel sounds are normal. He exhibits no distension. There is no tenderness.   Musculoskeletal: Normal range of motion. He exhibits no edema.   Neurological: He is alert. He has normal strength.   Oriented to person and month, not to date or place  Expressive aphasia   Skin: Skin is warm and dry. No erythema. No pallor.         ED Course   Critical Care  Date/Time: 11/14/2018 12:37 AM  Performed by: Brenden Brandon MD  Authorized by: Brenden Brandon MD   Total critical care time (exclusive of procedural time) : 40 minutes  Critical care was necessary to treat or prevent imminent or life-threatening deterioration of the following conditions: neurologic deterioration.        Labs Reviewed   CBC W/ AUTO DIFFERENTIAL - Abnormal; Notable for the following components:       Result Value    RBC 3.67 (*)     Hemoglobin 11.6 (*)     Hematocrit 34.5 (*)     MCH 31.6 (*)     Lymph # 0.7 (*)     Lymph% 11.8 (*)     All other components within normal limits   PROTIME-INR    AMMONIA   COMPREHENSIVE METABOLIC PANEL   MAGNESIUM   LIPID PANEL   URINALYSIS, REFLEX TO URINE CULTURE   TSH   DRUG SCREEN PANEL, URINE EMERGENCY          Imaging Results    None          Medical Decision Making:   History:   Old Medical Records: I decided to obtain old medical records.  Initial Assessment:   Patient is a 85yoM who presents with confusion and reported weakness. CT at OSH with no evidence of hemorrhage/early stroke.  Hypertensive requiring cardene gtt. Patient encephalopathy may be secondary to hypertension.  Differential Diagnosis:   Stroke, Hypertensive emergency, electrolyte derangement, hypo/hyper thyroidism, UTI  Clinical Tests:   Lab Tests: Ordered and Reviewed  Radiological Study: Ordered and Reviewed  Medical Tests: Ordered and Reviewed  ED Management:  1:08 AM Discussed case with NICU. Patient back to baseline. No focal neuro deficits. Will admit to [facility] medicine.   Other:   I have discussed this case with another health care provider.       <> Summary of the Discussion: Inpatient consult to Neuro Critical Care                       Clinical Impression:   The encounter diagnosis was Encephalopathy acute.                             Geo Barbosa MD  Resident  11/14/18 011

## 2018-11-14 NOTE — SUBJECTIVE & OBJECTIVE
Past Medical History:   Diagnosis Date    Arthritis     CHF (congestive heart failure)     Hypertension        Past Surgical History:   Procedure Laterality Date    APPENDECTOMY      CARDIAC SURGERY      EYE SURGERY      HERNIA REPAIR         Review of patient's allergies indicates:  No Known Allergies    No current facility-administered medications on file prior to encounter.      Current Outpatient Medications on File Prior to Encounter   Medication Sig    aspirin (ECOTRIN) 325 MG EC tablet Take 325 mg by mouth once a week.    aspirin 81 MG Chew Take 81 mg by mouth once daily.    clopidogrel (PLAVIX) 75 mg tablet Take 75 mg by mouth once daily.    losartan (COZAAR) 100 MG tablet Take 100 mg by mouth once daily.     Family History     None        Tobacco Use    Smoking status: Never Smoker   Substance and Sexual Activity    Alcohol use: No     Frequency: Never    Drug use: No    Sexual activity: Yes     Partners: Female     Review of Systems   Constitutional: Negative for chills, fatigue and fever.   Eyes: Negative for photophobia and visual disturbance.   Respiratory: Negative for cough, shortness of breath and wheezing.    Cardiovascular: Negative for chest pain, palpitations and leg swelling.   Gastrointestinal: Negative for abdominal distention, abdominal pain, nausea and vomiting.   Genitourinary: Negative for difficulty urinating and dysuria.   Musculoskeletal: Negative for arthralgias, back pain and gait problem.   Skin: Negative for rash and wound.   Neurological: Negative for dizziness, tremors, facial asymmetry, weakness, light-headedness and headaches.   Psychiatric/Behavioral: Positive for confusion. Negative for agitation.     Objective:     Vital Signs (Most Recent):  Temp: 98.5 °F (36.9 °C) (11/13/18 2249)  Pulse: 85 (11/14/18 0202)  Resp: 16 (11/13/18 2249)  BP: (!) 167/91 (11/14/18 0202)  SpO2: 97 % (11/14/18 0202) Vital Signs (24h Range):  Temp:  [98.5 °F (36.9 °C)] 98.5 °F (36.9  °C)  Pulse:  [80-90] 85  Resp:  [16] 16  SpO2:  [93 %-99 %] 97 %  BP: (146-172)/(81-92) 167/91     Weight: 81.6 kg (180 lb)  Body mass index is 29.95 kg/m².    Physical Exam   Constitutional: He is oriented to person, place, and time. He appears well-developed and well-nourished. No distress.   HENT:   Head: Normocephalic and atraumatic.   Eyes: EOM are normal. Pupils are equal, round, and reactive to light.   Neck: Normal range of motion. Neck supple.   Cardiovascular: Normal rate and regular rhythm.   Murmur heard.  Pulmonary/Chest: Effort normal and breath sounds normal. No respiratory distress.   Abdominal: Soft. Bowel sounds are normal. He exhibits no distension. There is no tenderness.   Musculoskeletal: Normal range of motion. He exhibits no edema.   Neurological: He is alert and oriented to person, place, and time. No cranial nerve deficit.   5+ strength UE/LE, no facial droop, no slurred speech, AO x 4   Skin: Skin is warm and dry. He is not diaphoretic.   Psychiatric: He has a normal mood and affect. His behavior is normal. Judgment and thought content normal.   Nursing note and vitals reviewed.        CRANIAL NERVES     CN III, IV, VI   Pupils are equal, round, and reactive to light.  Extraocular motions are normal.        Significant Labs:   CBC:   Recent Labs   Lab 11/13/18 2307   WBC 5.91   HGB 11.6*   HCT 34.5*        CMP:   Recent Labs   Lab 11/13/18 2307      K 3.8      CO2 25      BUN 18   CREATININE 1.1   CALCIUM 9.0   PROT 7.6   ALBUMIN 3.4*   BILITOT 0.5   ALKPHOS 90   AST 39   ALT 23   ANIONGAP 7*   EGFRNONAA >60.0     Cardiac Markers: No results for input(s): CKMB, MYOGLOBIN, BNP, TROPISTAT in the last 48 hours.  Troponin: No results for input(s): TROPONINI in the last 48 hours.  TSH:   Recent Labs   Lab 11/13/18  2307   TSH 2.379     Urine Culture: No results for input(s): LABURIN in the last 48 hours.    Significant Imaging: I have reviewed all pertinent imaging  results/findings within the past 24 hours.

## 2018-11-14 NOTE — HPI
Mr. Prescott is a 84 y/o male with PMH significant for CAD s/p multiple CABG, HTN, CHF who presents to [facility] ED as a transfer for AMS with concern for SAH. Patient developed acute confusion at approximately 1300 on 11/13, which prompted his wife to bring him to him to the ED. SBP on initial evaluation in OSH was 200+ and he was started on nicardipine gtt. CTH at OSH showed questionable R SAH. CTA negative for aneurysm. Patient was transferred here for evaluation of possible SAH.     Patient and wife deny any recent trauma, state that he fell backward while working in his shed approximately one month ago, but has had no symptoms (HA, weakness, confusion) between then and today.    On evaluation here, patient returning to baseline mental status per wife, although still a bit slower than usual. He denies any current HA, weakness, worsening vision, or dizziness. He is no longer on a nicardipine infusion.

## 2018-11-14 NOTE — ASSESSMENT & PLAN NOTE
86 y/o presents with acute hypertension and AMS yesterday afternoon. Transferred from [facility] with NCC consulted for concern for SAH on CTH.   - CTH and CTA reviewed with Dr. Bush, no compelling evidence of SAH. No aneurysm or other malformation on CTA.  - Likely hypertensive encephalopathy, which is now resolving. Off nicardipine.   - No need for neurointensive care at this time. Recommend BP management and continued monitoring of neurological status.   - Please call neurocritical care with any questions or concerns.       Thank you for your consult. We will sign off.

## 2018-11-14 NOTE — ED NOTES
Bed: 07  Expected date: 11/13/18  Expected time: 10:25 PM  Means of arrival:   Comments:  Gerardo Abdi RN  11/13/18 6254

## 2018-11-14 NOTE — HPI
Rafael Prescott is an 85 y.o. male with HTN, HLD, CAD s/p CABG x 4 on DAPT who presents as a transfer from [facility] for confusion and weakness. Family reports onset of symptoms at 1 pm when he acutely developed confusion, shuffling gait, slumping to the right, and expressive aphasia. He presented to [facility] around 230 and his SBP >200 and started on cardene drip, CTH showed questionable R SAH. CTA negative for aneurysm. Patient was transferred here for evaluation of possible SAH. On arrival to [facility] ED, BP was better controlled cardene drip was stopped and his symptoms have resolved to 95% to baseline.     [facility] and notes that his PCP has been dealing with BP for the last few months. He has been on losartan for a few months, but recently started having high potassium levels for which he was taking kayexalate, but still was taking lorastan. He was also started on a medication that made him weak and was stopped - medication started with an H, dosed TID at 50 mg. He denies CP, no SOB, no new medication,  no fever, no cough, no vision changes, no HA. SBP at home 180s/90 last few days. Episode has never happened before.    ED: evaluated by NCC, no bleed and no need for admission with resolving BP and symptoms, UDS, UA negative.

## 2018-11-15 VITALS
RESPIRATION RATE: 18 BRPM | WEIGHT: 171 LBS | TEMPERATURE: 97 F | OXYGEN SATURATION: 97 % | HEART RATE: 69 BPM | DIASTOLIC BLOOD PRESSURE: 72 MMHG | BODY MASS INDEX: 28.49 KG/M2 | HEIGHT: 65 IN | SYSTOLIC BLOOD PRESSURE: 145 MMHG

## 2018-11-15 LAB
ANION GAP SERPL CALC-SCNC: 6 MMOL/L
BASOPHILS # BLD AUTO: 0.02 K/UL
BASOPHILS NFR BLD: 0.5 %
BUN SERPL-MCNC: 26 MG/DL
CALCIUM SERPL-MCNC: 8.6 MG/DL
CHLORIDE SERPL-SCNC: 107 MMOL/L
CO2 SERPL-SCNC: 26 MMOL/L
CREAT SERPL-MCNC: 1.2 MG/DL
DIFFERENTIAL METHOD: ABNORMAL
EOSINOPHIL # BLD AUTO: 0.4 K/UL
EOSINOPHIL NFR BLD: 9 %
ERYTHROCYTE [DISTWIDTH] IN BLOOD BY AUTOMATED COUNT: 13.6 %
EST. GFR  (AFRICAN AMERICAN): >60 ML/MIN/1.73 M^2
EST. GFR  (NON AFRICAN AMERICAN): 54.8 ML/MIN/1.73 M^2
GLUCOSE SERPL-MCNC: 95 MG/DL
HCT VFR BLD AUTO: 32.1 %
HGB BLD-MCNC: 10.4 G/DL
IMM GRANULOCYTES # BLD AUTO: 0.01 K/UL
IMM GRANULOCYTES NFR BLD AUTO: 0.2 %
LYMPHOCYTES # BLD AUTO: 0.8 K/UL
LYMPHOCYTES NFR BLD: 20.1 %
MAGNESIUM SERPL-MCNC: 2 MG/DL
MCH RBC QN AUTO: 31.5 PG
MCHC RBC AUTO-ENTMCNC: 32.4 G/DL
MCV RBC AUTO: 97 FL
MONOCYTES # BLD AUTO: 0.5 K/UL
MONOCYTES NFR BLD: 13.2 %
NEUTROPHILS # BLD AUTO: 2.3 K/UL
NEUTROPHILS NFR BLD: 57 %
NRBC BLD-RTO: 0 /100 WBC
PHOSPHATE SERPL-MCNC: 3.7 MG/DL
PLATELET # BLD AUTO: 180 K/UL
PMV BLD AUTO: 10 FL
POTASSIUM SERPL-SCNC: 4.5 MMOL/L
RBC # BLD AUTO: 3.3 M/UL
SODIUM SERPL-SCNC: 139 MMOL/L
WBC # BLD AUTO: 4.02 K/UL

## 2018-11-15 PROCEDURE — G0378 HOSPITAL OBSERVATION PER HR: HCPCS

## 2018-11-15 PROCEDURE — 99900035 HC TECH TIME PER 15 MIN (STAT)

## 2018-11-15 PROCEDURE — 80048 BASIC METABOLIC PNL TOTAL CA: CPT

## 2018-11-15 PROCEDURE — A4216 STERILE WATER/SALINE, 10 ML: HCPCS | Performed by: PHYSICIAN ASSISTANT

## 2018-11-15 PROCEDURE — 83735 ASSAY OF MAGNESIUM: CPT

## 2018-11-15 PROCEDURE — 36415 COLL VENOUS BLD VENIPUNCTURE: CPT

## 2018-11-15 PROCEDURE — 85025 COMPLETE CBC W/AUTO DIFF WBC: CPT

## 2018-11-15 PROCEDURE — 84100 ASSAY OF PHOSPHORUS: CPT

## 2018-11-15 PROCEDURE — 25000003 PHARM REV CODE 250: Performed by: PHYSICIAN ASSISTANT

## 2018-11-15 PROCEDURE — 99217 PR OBSERVATION CARE DISCHARGE: CPT | Mod: ,,, | Performed by: INTERNAL MEDICINE

## 2018-11-15 RX ORDER — CARVEDILOL 3.12 MG/1
3.12 TABLET ORAL ONCE
Status: COMPLETED | OUTPATIENT
Start: 2018-11-15 | End: 2018-11-15

## 2018-11-15 RX ORDER — AMLODIPINE BESYLATE 10 MG/1
10 TABLET ORAL DAILY
Qty: 30 TABLET | Refills: 1 | Status: SHIPPED | OUTPATIENT
Start: 2018-11-15 | End: 2019-11-15

## 2018-11-15 RX ORDER — HYDROCHLOROTHIAZIDE 12.5 MG/1
12.5 TABLET ORAL DAILY
Qty: 30 TABLET | Refills: 1 | Status: SHIPPED | OUTPATIENT
Start: 2018-11-15 | End: 2019-11-15

## 2018-11-15 RX ORDER — CARVEDILOL 6.25 MG/1
6.25 TABLET ORAL DAILY
Qty: 30 TABLET | Refills: 1 | Status: SHIPPED | OUTPATIENT
Start: 2018-11-15 | End: 2019-11-15

## 2018-11-15 RX ORDER — CARVEDILOL 3.12 MG/1
3.12 TABLET ORAL 2 TIMES DAILY
Status: DISCONTINUED | OUTPATIENT
Start: 2018-11-15 | End: 2018-11-15

## 2018-11-15 RX ORDER — CARVEDILOL 6.25 MG/1
6.25 TABLET ORAL DAILY
Status: DISCONTINUED | OUTPATIENT
Start: 2018-11-16 | End: 2018-11-15 | Stop reason: HOSPADM

## 2018-11-15 RX ADMIN — ASPIRIN 81 MG CHEWABLE TABLET 81 MG: 81 TABLET CHEWABLE at 08:11

## 2018-11-15 RX ADMIN — HYDROCHLOROTHIAZIDE 12.5 MG: 12.5 TABLET ORAL at 08:11

## 2018-11-15 RX ADMIN — AMLODIPINE BESYLATE 10 MG: 10 TABLET ORAL at 08:11

## 2018-11-15 RX ADMIN — Medication 5 ML: at 08:11

## 2018-11-15 RX ADMIN — CARVEDILOL 3.12 MG: 3.12 TABLET, FILM COATED ORAL at 10:11

## 2018-11-15 RX ADMIN — CARVEDILOL 3.12 MG: 3.12 TABLET, FILM COATED ORAL at 08:11

## 2018-11-15 RX ADMIN — POLYETHYLENE GLYCOL 3350 17 G: 17 POWDER, FOR SOLUTION ORAL at 08:11

## 2018-11-15 RX ADMIN — CLOPIDOGREL 75 MG: 75 TABLET, FILM COATED ORAL at 08:11

## 2018-11-15 NOTE — PROGRESS NOTES
[facility] Hospital Medicine  Progress Note    Patient Name: Rafael Prescott  MRN: [id number]  Patient Class: OP- Observation   Admission Date: 11/13/2018  Length of Stay: 0 days  Attending Physician: ROBIN Koch MD  Primary Care Provider: [facility] Medicine Team: [facility] HOSP MED V Celena Regalado PA-C    Subjective:     Principal Problem:Hypertensive encephalopathy    HPI:  Rafael Prescott is an 85 y.o. male with HTN, HLD, CAD s/p CABG x 4 on DAPT who presents as a transfer from [facility] for confusion and weakness. Family reports onset of symptoms at 1 pm when he acutely developed confusion, shuffling gait, slumping to the right, and expressive aphasia. He presented to [facility] around 230 and his SBP >200 and started on cardene drip, CTH showed questionable R SAH. CTA negative for aneurysm. Patient was transferred here for evaluation of possible SAH. On arrival to [facility] ED, BP was better controlled cardene drip was stopped and his symptoms have resolved to 95% to baseline.     Family states he did take BP medications this mornin and notes that his PCP has been dealing with BP for the last few months. He has been on losartan for a few months, but recently started having high potassium levels for which he was taking kayexalate, but still was taking lorastan. He was also started on a medication that made him weak and was stopped - medication started with an H, dosed TID at 50 mg. He denies CP, no SOB, no new medication,  no fever, no cough, no vision changes, no HA. SBP at home 180s/90 last few days. Episode has never happened before.    ED: evaluated by NCC, no bleed and no need for admission with resolving BP and symptoms, UDS, UA negative.    Hospital Course:  85M admitted to [facility] for BP control in setting of HTN emergency SBP >200. Transferred from [facility] with NCC consulted for concern for SAH on CTH. CTH and CTA reviewed, no compelling evidence of SAH. No aneurysm or other  malformation on CTA. Likely hypertensive encephalopathy, which is now resolving. Home Losartan 100mg discontinued as patient's family reports it caused him to be hyperkalemic. K 3.5 on admit. Started Norvasc 10mg, Coreg 3.125mg and HCTZ 12.5mg BP improving today 158/95. TTE shows indeterminate diastolic dysfunction with pEF, severe mitral valve stenosis. Will monitor overnight. PCP and Cardiology f/u at discharge.     Interval History: No acute events overnight. Pt sitting in bed eating lunch with family at bedside, they report improvement in his original symptoms of confusion. Most recent BP communicated by nurse 158/95. Discussed plan of care.     Review of Systems   Constitutional: Negative for chills, fatigue and fever.   Eyes: Negative for redness.   Respiratory: Negative for cough and shortness of breath.    Cardiovascular: Negative for chest pain.   Gastrointestinal: Negative for abdominal pain and nausea.   Genitourinary: Negative for difficulty urinating and dysuria.   Musculoskeletal: Negative for back pain and gait problem.   Skin: Negative for rash.   Neurological: Negative for tremors and facial asymmetry.   Psychiatric/Behavioral: Negative for agitation and confusion.     Objective:     Vital Signs (Most Recent):  Temp: 97.6 °F (36.4 °C) (11/14/18 1251)  Pulse: 73 (11/14/18 1251)  Resp: 18 (11/14/18 1251)  BP: (!) 176/95 (11/14/18 0700)  SpO2: 96 % (11/14/18 1251) Vital Signs (24h Range):  Temp:  [97.6 °F (36.4 °C)-98.5 °F (36.9 °C)] 97.6 °F (36.4 °C)  Pulse:  [70-90] 73  Resp:  [16-18] 18  SpO2:  [93 %-99 %] 96 %  BP: (146-176)/(81-95) 176/95     Weight: 77.6 kg (171 lb)  Body mass index is 28.46 kg/m².  No intake or output data in the 24 hours ending 11/14/18 1352   Physical Exam   Constitutional: He is oriented to person, place, and time. He appears well-developed and well-nourished. No distress.   HENT:   Head: Normocephalic and atraumatic.   Eyes: EOM are normal. Right eye exhibits no discharge.  Left eye exhibits no discharge.   Neck: Normal range of motion. Neck supple.   Cardiovascular: Normal rate and regular rhythm.   Murmur heard.  Pulmonary/Chest: Effort normal and breath sounds normal. No stridor. No respiratory distress. He has no wheezes.   Abdominal: Soft. He exhibits no distension. There is no tenderness.   Musculoskeletal: Normal range of motion. He exhibits no edema.   Neurological: He is alert and oriented to person, place, and time. No cranial nerve deficit.   5+ strength UE/LE, no facial droop, no slurred speech, AO to person, place, month, and president   Skin: Skin is warm and dry. He is not diaphoretic.   Psychiatric: He has a normal mood and affect. His behavior is normal.   Nursing note and vitals reviewed.      Significant Labs: All pertinent labs within the past 24 hours have been reviewed.    Significant Imaging: I have reviewed all pertinent imaging results/findings within the past 24 hours.    Assessment/Plan:      * Hypertensive encephalopathy    Essential Hypertension  - stop losartan 2/2 reported hyperkalemia at home  - start norvasc 5 mg, hydralazine 10 mg PRN   - suspect patient was previously on hydralazine 50 mg TID w/o checking BP prior to give causing him to feel weak and was subsequently stopped  - avoid over correction of BP  - symptoms resolving with improvement in BP  - suspected SAH on CTH at OSH not confirmd with CTA, NCC consult appreciated  - neuro checks  - echo in AM  11/14: Renal US negative for renal artery stenosis  - increased Norvasc to 10mg; started Coreg 3.125mg and HCTZ 12.5mg daily  BP labile throughout day  -Echo shows indeterminate diastolic dysfunction with pEF, severe mitral valve stenosis.      Coronary artery disease involving native coronary artery of native heart    - continue plavix, ASA     CHF (congestive heart failure)    - historical diagnosis, no echo on file  - family unaware of diagnosis  11/14: TTE shows indeterminate diastolic  dysfunction with pEF, severe mitral valve stenosis.        VTE Risk Mitigation (From admission, onward)        Ordered     Place MARY hose  Until discontinued      11/14/18 0114     Place sequential compression device  Until discontinued      11/14/18 0114     IP VTE HIGH RISK PATIENT  Once      11/14/18 0114            Discussed with staff  Celena Regalado PA-C  Department of Hospital Medicine   [facility]

## 2018-11-15 NOTE — ASSESSMENT & PLAN NOTE
- historical diagnosis, no echo on file  - family unaware of diagnosis  11/14: TTE shows indeterminate diastolic dysfunction with pEF, severe mitral valve stenosis.

## 2018-11-15 NOTE — PLAN OF CARE
[facility]    HOME HEALTH ORDERS  FACE TO FACE ENCOUNTER    Patient Name: Rafael Prescott  YOB: 1933    PCP: Pelon Gama   PCP Address: [address] [place] [address]  PCP Phone Number: [phone]  PCP Fax: [phone]    Encounter Date: 11/15/2018    Admit to Home Health    Diagnoses:  Active Hospital Problems    Diagnosis  POA    *Hypertensive encephalopathy [I67.4]  Yes     Priority: 1 - High    Coronary artery disease involving native coronary artery of native heart [I25.10]  Yes     Priority: 2     CHF (congestive heart failure) [I50.9]  Yes     Priority: 3     Essential hypertension [I10]  Yes      Resolved Hospital Problems   No resolved problems to display.       No future appointments.  Follow-up Information     Pelon Gama On 11/27/2018.    Why:  at 9:15 AM  Contact information:  [address] [place] [address] [phone]             Gabriel Angulo MD On 12/4/2018.    Specialty:  Cardiology  Why:  at 8:45 AM  Contact information:  [address]  [place] [id number] [phone]                     I have seen and examined this patient face to face today. My clinical findings that support the need for the home health skilled services and home bound status are the following:  Patient with medication mismanagement issues requiring home bound status as evidenced by  labiel BP.    Allergies:Review of patient's allergies indicates:  No Known Allergies    Diet: cardiac diet    Activities: activity as tolerated    Nursing:   SN to complete comprehensive assessment including routine vital signs. Instruct on disease process and s/s of complications to report to MD. Review/verify medication list sent home with the patient at time of discharge  and instruct patient/caregiver as needed. Frequency may be adjusted depending on start of care date.    Notify MD if SBP > 160 or < 90; DBP > 90 or < 50; HR > 120 or < 50; Temp > 101; Other:   Please draw  TONY Monday 11/19 and check BP manually; report results to PCP [place] Gama      CONSULTS:    Aide to provide assistance with personal care, ADLs, and vital signs.    MISCELLANEOUS CARE:  N/A    WOUND CARE ORDERS  n/a      Medications: Review discharge medications with patient and family and provide education.      Current Discharge Medication List      START taking these medications    Details   amLODIPine (NORVASC) 10 MG tablet Take 1 tablet (10 mg total) by mouth once daily.  Qty: 30 tablet, Refills: 1      carvedilol (COREG) 6.25 MG tablet Take 1 tablet (6.25 mg total) by mouth once daily.  Qty: 30 tablet, Refills: 1      hydroCHLOROthiazide (HYDRODIURIL) 12.5 MG Tab Take 1 tablet (12.5 mg total) by mouth once daily.  Qty: 30 tablet, Refills: 1         CONTINUE these medications which have NOT CHANGED    Details   aspirin (ECOTRIN) 325 MG EC tablet Take 325 mg by mouth once a week.      aspirin 81 MG Chew Take 81 mg by mouth once daily.      clopidogrel (PLAVIX) 75 mg tablet Take 75 mg by mouth once daily.         STOP taking these medications       losartan (COZAAR) 100 MG tablet Comments:   Reason for Stopping:               I certify that this patient is confined to his home and needs intermittent skilled nursing care.

## 2018-11-15 NOTE — PLAN OF CARE
EVAN was informed by nurse Arrieta ([id number]) that the patient would like to receive home health care from [facility]. EVAN informed KO Grace ([id number]) of HH choice & requested that she right fax HH orders. Will continue to follow.

## 2018-11-15 NOTE — ASSESSMENT & PLAN NOTE
Essential Hypertension  - stop losartan 2/2 reported hyperkalemia at home  - start norvasc 5 mg, hydralazine 10 mg PRN   - suspect patient was previously on hydralazine 50 mg TID w/o checking BP prior to give causing him to feel weak and was subsequently stopped  - avoid over correction of BP  - symptoms resolving with improvement in BP  - suspected SAH on CTH at OSH not confirmd with CTA, NCC consult appreciated  - neuro checks  11/14: Renal US negative for renal artery stenosis  - increased Norvasc to 10mg; started Coreg 3.125mg and HCTZ 12.5mg daily  BP labile throughout day  -Echo shows indeterminate diastolic dysfunction with pEF, severe mitral valve stenosis.   11/15: increased Coreg to 6.25mg daily from 3.125mg; /72 at discharge  - pt alert and oriented, back at baseline per family; stable for discharge with [facility] nurse to check BP and BMP 11/19; PCP f/u 11/27

## 2018-11-15 NOTE — PLAN OF CARE
Problem: Patient Care Overview  Goal: Plan of Care Review  Outcome: Ongoing (interventions implemented as appropriate)  Pt in chair with family at bedside, did void to commode without difficulty. BP sustained at this time with systolic 150s post coreg admin. Replaced leads with reddened areas noted below LL chest wall, look like possibly burn markings. Only to that specific area. Pt denies any pain at this time. Report given to night shift nurse.

## 2018-11-15 NOTE — NURSING
Reviewed AVS with patient, wife and sister. IV out. Canula intact. Skin a little irritated where tele stickers where placed. Transportation escorted patient out in a wheelchair.

## 2018-11-15 NOTE — PLAN OF CARE
11/15/18 1734   Final Note   Assessment Type Final Discharge Note     [facility]. Discharge summary faxed to Dr. Pelon Gama (PCP) f [phone] & Dr. Greg Angulo (cards) f [phone].

## 2018-11-15 NOTE — PLAN OF CARE
Problem: Patient Care Overview  Goal: Plan of Care Review  Outcome: Ongoing (interventions implemented as appropriate)  Patient is alert and oriented x 3. Wife and family at bedside. Blood pressure was elevated earlier in the shift. However, blood pressure is stable for team to discharge today. Patient ambulates with one person assist. POC reviewed with patient and family. Nalini GORDON has reviewed home health assistance with family. No falls this admission.

## 2018-11-15 NOTE — PLAN OF CARE
Patient resting quietly in bed with daughter Clau Bennett ([phone]) at the bedside when CM rounded. CM informed patient & daughter of home health to be ordered & that a BMP & BP check will need to be done on Monday 11/19/18. Patient unable to recall home health company he had used in the past. Printed list of Medicare approved home health companies in [facility] given to Clau. Clau stated that the patient's spouse will call this CM with their HH choice. Plan to discharge patient home with home health today. Family to provide transportation at time of discharge. CM informed the patient's nurse, Meenakshi ([phone]), of discharge status. Will continue to follow.

## 2018-11-15 NOTE — DISCHARGE INSTRUCTIONS
TOMORROW: START taking Norvasc 10mg daily, Hydrochlorothiazide 12.5mg daily, and Coreg 6.25mg daily  STOP taking Losartan (Cozaar)    [facility] Nurse will come to house 11/19 to check BP and K+ levels on BMP.    PCP follow up scheduled for 11/27 9:15; Cardiology follow up scheduled 12/4 8:45

## 2018-11-15 NOTE — PLAN OF CARE
SW given directive to send pt's HH to [facility] at pt's request.  SW called [facility] and got number to send orders to [facility] at [phone].  SW had to manually fax as the [place] location was not in right care.  KO received notification via email that fax had been received.        Kim Biswas LMSW

## 2018-11-16 NOTE — DISCHARGE SUMMARY
[facility] Hospital Medicine  Discharge Summary      Patient Name: Rafael Prescott  MRN: [id number]  Admission Date: 11/13/2018  Hospital Length of Stay: 0 days  Discharge Date and Time: 11/15/2018  3:54 PM  Attending Physician: No att. providers found   Discharging Provider: Celena Regalado PA-C  Primary Care Provider: [facility] Medicine Team: [facility] HOSP MED V Celena Regalado PA-C    HPI:   Rafael Prescott is an 85 y.o. 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 Family reports onset of symptoms at 1 pm when he acutely developed confusion, shuffling gait, slumping to the right, and expressive aphasia. He presented to [facility] around 230 and his SBP >200 and started on cardene drip, CTH showed questionable R SAH. CTA negative for aneurysm. Patient was transferred here for evaluation of possible SAH. On arrival to [facility] ED, BP was better controlled cardene drip was stopped and his symptoms have resolved to 95% to baseline.     Family states he did take BP medications this mornin and notes that his PCP has been dealing with BP for the last few months. He has been on losartan for a few months, but recently started having high potassium levels for which he was taking kayexalate, but still was taking lorastan. He was also started on a medication that made him weak and was stopped - medication started with an H, dosed TID at 50 mg. He denies CP, no SOB, no new medication,  no fever, no cough, no vision changes, no HA. SBP at home 180s/90 last few days. Episode has never happened before.    ED: evaluated by NCC, no bleed and no need for admission with resolving BP and symptoms, UDS, UA negative.    * No surgery found *      Hospital Course:   85M admitted to [facility] for BP control in setting of HTN emergency SBP >200. Transferred from [facility] for concern for SAH on CTH. Neuro Critical Care at [facility] reviewed CTH and CTA, no  compelling evidence of SAH. No aneurysm or other malformation on CTA. Likely hypertensive encephalopathy, which is began resolving since admission. Home Losartan 100mg discontinued as patient's family reports it caused him to be hyperkalemic. K 3.5>4.5 during stay. Started Norvasc 10mg, Coreg 6.25mg and HCTZ 12.5mg BP improving, 145/72 on discharge. TTE shows indeterminate diastolic dysfunction with pEF, severe mitral valve stenosis. Patient alert and oriented and back at baseline per family, stable for discharge with [facility] nurse to check BP Mon 11/19 as well as check BMP for K+ levels. PCP f/u 11/27 and Cardiology f/u 12/4.     Consults:   Consults (From admission, onward)        Status Ordering Provider     IP consult to case management  Once     Provider:  (Not yet assigned)    Completed ROBIN QUAN          * Hypertensive encephalopathy    Essential Hypertension  - stop losartan 2/2 reported hyperkalemia at home  - start norvasc 5 mg, hydralazine 10 mg PRN   - suspect patient was previously on hydralazine 50 mg TID w/o checking BP prior to give causing him to feel weak and was subsequently stopped  - avoid over correction of BP  - symptoms resolving with improvement in BP  - suspected SAH on CTH at OSH not confirmd with CTA, NCC consult appreciated  - neuro checks  11/14: Renal US negative for renal artery stenosis  - increased Norvasc to 10mg; started Coreg 3.125mg and HCTZ 12.5mg daily  BP labile throughout day  -Echo shows indeterminate diastolic dysfunction with pEF, severe mitral valve stenosis.   11/15: increased Coreg to 6.25mg daily from 3.125mg; /72 at discharge  - pt alert and oriented, back at baseline per family; stable for discharge with [facility] nurse to check BP and BMP 11/19; PCP f/u 11/27     Coronary artery disease involving native coronary artery of native heart    - continue plavix, ASA     CHF (congestive heart failure)    - historical diagnosis, no echo on file  - family unaware of  diagnosis  11/14: TTE shows indeterminate diastolic dysfunction with pEF, severe mitral valve stenosis.        Final Active Diagnoses:    Diagnosis Date Noted POA    PRINCIPAL PROBLEM:  Hypertensive encephalopathy [I67.4] 11/13/2018 Yes    Coronary artery disease involving native coronary artery of native heart [I25.10] 11/14/2018 Yes    CHF (congestive heart failure) [I50.9] 11/14/2018 Yes    Essential hypertension [I10] 11/14/2018 Yes      Problems Resolved During this Admission:       Discharged Condition: stable    Disposition: Home or Self Care    Follow Up:  Follow-up Information     Pelon Gama On 11/27/2018.    Why:  at 9:15 AM  Contact information:  [address] [place] [id number] [phone]             Gabriel Angulo MD On 12/4/2018.    Specialty:  Cardiology  Why:  at 8:45 AM  Contact information:  [address] [place] [address]  [phone]                 Patient Instructions:      BASIC METABOLIC PANEL   Standing Status: Future Standing Exp. Date: 01/14/20     Notify your health care provider if you experience any of the following:  temperature >100.4     Notify your health care provider if you experience any of the following:  redness, tenderness, or signs of infection (pain, swelling, redness, odor or green/yellow discharge around incision site)     Notify your health care provider if you experience any of the following:  severe persistent headache     Notify your health care provider if you experience any of the following:  persistent dizziness, light-headedness, or visual disturbances     Notify your health care provider if you experience any of the following:  increased confusion or weakness     Activity as tolerated       Significant Diagnostic Studies: Labs:   CMP   Recent Labs   Lab 11/13/18  2307 11/14/18  0637 11/15/18  0609    139 139   K 3.8 3.5 4.5    109 107   CO2 25 23 26    109 95   BUN 18 19 26*   CREATININE 1.1 1.1 1.2   CALCIUM 9.0  8.4* 8.6*   PROT 7.6  --   --    ALBUMIN 3.4*  --   --    BILITOT 0.5  --   --    ALKPHOS 90  --   --    AST 39  --   --    ALT 23  --   --    ANIONGAP 7* 7* 6*   ESTGFRAFRICA >60.0 >60.0 >60.0   EGFRNONAA >60.0 >60.0 54.8*       Pending Diagnostic Studies:     None         Medications:  Reconciled Home Medications:      Medication List      START taking these medications    amLODIPine 10 MG tablet  Commonly known as:  NORVASC  Take 1 tablet (10 mg total) by mouth once daily.     carvedilol 6.25 MG tablet  Commonly known as:  COREG  Take 1 tablet (6.25 mg total) by mouth once daily.     hydroCHLOROthiazide 12.5 MG Tab  Commonly known as:  HYDRODIURIL  Take 1 tablet (12.5 mg total) by mouth once daily.        CONTINUE taking these medications    * aspirin 81 MG Chew  Take 81 mg by mouth once daily.     * aspirin 325 MG EC tablet  Commonly known as:  ECOTRIN  Take 325 mg by mouth once a week.     clopidogrel 75 mg tablet  Commonly known as:  PLAVIX  Take 75 mg by mouth once daily.         * This list has 2 medication(s) that are the same as other medications prescribed for you. Read the directions carefully, and ask your doctor or other care provider to review them with you.            STOP taking these medications    losartan 100 MG tablet  Commonly known as:  COZAAR            Indwelling Lines/Drains at time of discharge:   Lines/Drains/Airways          None          Time spent on the discharge of patient: >35 minutes  Patient was seen and examined on the date of discharge and determined to be suitable for discharge.       Discussed with staff.  Celena Regalado PA-C  Department of Hospital Medicine  [facility]

## 2019-01-10 RX ORDER — HYDROCHLOROTHIAZIDE 12.5 MG/1
12.5 TABLET ORAL DAILY
Qty: 30 TABLET | Refills: 1 | Status: CANCELLED | OUTPATIENT
Start: 2019-01-10 | End: 2020-01-10
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
## 2020-09-15 PROBLEM — I77.72 DISSECTION OF RIGHT ILIAC ARTERY: Status: ACTIVE | Noted: 2020-09-15

## 2020-09-15 PROBLEM — S72.102A CLOSED FRACTURE OF TROCHANTER OF LEFT FEMUR: Status: ACTIVE | Noted: 2020-09-15

## 2020-09-15 PROBLEM — S72.142A CLOSED FRACTURE OF FEMUR, INTERTROCHANTERIC, LEFT, INITIAL ENCOUNTER: Status: ACTIVE | Noted: 2020-09-15

## 2020-09-16 PROBLEM — Z01.818 PREOPERATIVE EVALUATION TO RULE OUT SURGICAL CONTRAINDICATION: Status: ACTIVE | Noted: 2020-09-16

## 2020-09-16 PROBLEM — S72.102A CLOSED FRACTURE OF TROCHANTER OF LEFT FEMUR: Status: ACTIVE | Noted: 2020-09-16

## 2020-09-16 PROBLEM — S72.142A CLOSED INTERTROCHANTERIC FRACTURE OF LEFT FEMUR: Chronic | Status: ACTIVE | Noted: 2020-09-15

## 2020-09-16 PROBLEM — I95.81 HYPOTENSION AFTER PROCEDURE: Status: ACTIVE | Noted: 2020-09-16

## 2020-09-16 PROBLEM — D62 ACUTE BLOOD LOSS ANEMIA: Status: ACTIVE | Noted: 2020-09-16

## 2020-09-16 PROBLEM — Z95.1 HX OF CABG: Status: ACTIVE | Noted: 2020-09-16

## 2020-09-16 PROBLEM — Z95.1 HX OF CABG: Chronic | Status: ACTIVE | Noted: 2020-09-16

## 2020-09-16 PROBLEM — S72.102A CLOSED FRACTURE OF TROCHANTER OF LEFT FEMUR: Status: RESOLVED | Noted: 2020-09-15 | Resolved: 2020-09-16

## 2020-09-16 PROBLEM — I25.10 CAD (CORONARY ARTERY DISEASE): Chronic | Status: ACTIVE | Noted: 2020-09-16

## 2020-09-16 PROBLEM — I77.72 DISSECTION OF RIGHT ILIAC ARTERY: Chronic | Status: ACTIVE | Noted: 2020-09-15

## 2020-09-18 PROBLEM — I95.81 HYPOTENSION AFTER PROCEDURE: Status: RESOLVED | Noted: 2020-09-16 | Resolved: 2020-09-18

## 2020-09-21 PROBLEM — Z01.818 PREOPERATIVE EVALUATION TO RULE OUT SURGICAL CONTRAINDICATION: Status: RESOLVED | Noted: 2020-09-16 | Resolved: 2020-09-21
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
## 2020-09-22 PROBLEM — D62 ACUTE BLOOD LOSS ANEMIA: Status: RESOLVED | Noted: 2020-09-16 | Resolved: 2020-09-22

## 2020-09-29 ENCOUNTER — NURSE TRIAGE (OUTPATIENT)
Dept: ADMINISTRATIVE | Facility: CLINIC | Age: 85
End: 2020-09-29

## 2020-09-29 NOTE — TELEPHONE ENCOUNTER
Reason for Disposition   Caller has cancelled the call before the first contact    Additional Information   Negative: Caller is angry or rude (e.g., hangs up, verbally abusive, yelling)   Negative: Caller hangs up   Negative: Caller has already spoken with the PCP and has no further questions.   Negative: Caller has already spoken with another triager and has no further questions.   Negative: Caller has already spoken with another triager or PCP AND has further questions AND triager able to answer questions.   Negative: Patient already left for the hospital/clinic.   Negative: Pager number given.  Answering service notified.   Negative: Cell phone out of range.  Phone number verified.   Negative: Second attempt to contact family AND no contact made.  Phone number verified.   Negative: Wrong number reached.  Phone number verified.   Negative: Message left with person in household.   Negative: Message left on unidentified voice mail.  Phone number verified.   Negative: Message left on identified voice mail   Negative: No answer.  First attempt to contact caller.  Follow-up call scheduled within 15 minutes.   Negative: Busy signal.  First attempt to contact caller.  Follow-up call scheduled within 15 minutes.    Protocols used: NO CONTACT OR DUPLICATE CONTACT CALL-A-

## 2020-09-29 NOTE — TELEPHONE ENCOUNTER
Patient on [facility] post procedure symptom tracker.  Patient resides in skilled nursing facility.  No contact required